# Patient Record
Sex: FEMALE | Race: WHITE | Employment: UNEMPLOYED | URBAN - METROPOLITAN AREA
[De-identification: names, ages, dates, MRNs, and addresses within clinical notes are randomized per-mention and may not be internally consistent; named-entity substitution may affect disease eponyms.]

---
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
## 2024-06-30 ENCOUNTER — HEALTH MAINTENANCE LETTER (OUTPATIENT)
Age: 19
End: 2024-06-30

## 2024-07-25 ENCOUNTER — OFFICE VISIT (OUTPATIENT)
Dept: FAMILY MEDICINE | Facility: CLINIC | Age: 19
End: 2024-07-25

## 2024-07-25 VITALS
SYSTOLIC BLOOD PRESSURE: 98 MMHG | OXYGEN SATURATION: 98 % | RESPIRATION RATE: 16 BRPM | TEMPERATURE: 98.2 F | DIASTOLIC BLOOD PRESSURE: 58 MMHG | HEART RATE: 65 BPM | WEIGHT: 120 LBS | BODY MASS INDEX: 21.13 KG/M2

## 2024-07-25 DIAGNOSIS — B07.0 PLANTAR WARTS: ICD-10-CM

## 2024-07-25 DIAGNOSIS — L73.9 FOLLICULITIS: Primary | ICD-10-CM

## 2024-07-25 PROCEDURE — 17110 DESTRUCTION B9 LES UP TO 14: CPT | Performed by: NURSE PRACTITIONER

## 2024-07-25 PROCEDURE — 99203 OFFICE O/P NEW LOW 30 MIN: CPT | Mod: 25 | Performed by: NURSE PRACTITIONER

## 2024-07-25 RX ORDER — MUPIROCIN 20 MG/G
OINTMENT TOPICAL 3 TIMES DAILY
Qty: 30 G | Refills: 0 | Status: SHIPPED | OUTPATIENT
Start: 2024-07-25

## 2024-07-25 ASSESSMENT — PAIN SCALES - GENERAL: PAINLEVEL: NO PAIN (0)

## 2024-07-25 NOTE — PATIENT INSTRUCTIONS
Use the topical antibiotic to the lesion on your leg.  Use hot packs 3-4 times daily to promote drainage.  Let me know if worsening or not improving.    Following treatment with liquid nitrogen your skin may get a blister. Try to keep this intact and keep the area clean and dry.  If no blister occurs, it is ok to use a pumice stone or nail file to gently file down the thickened warty tissue (do not use the same nail file that you use on your finger nails or it could spread the virus).  You may need to return for additional treatment if the wart does not completely resolve.  Plan to return after roughly 3 weeks once the area has healed, if still showing signs of persistent warty tissue.      May start using OTC treatments (Mediplast or Dr. Norton) if treatment ineffective.

## 2024-07-25 NOTE — PROGRESS NOTES
Assessment & Plan     Folliculitis  Appears to be inflamed follicle, no fluctuance, minimal surrounding erythema. I think this can be well treated with topical abx, discussed hot packing. Follow up if not improving.  - mupirocin (BACTROBAN) 2 % external ointment; Apply topically 3 times daily    Plantar warts  Small plantar wart to left foot. Reviewed over the counter options, would like LN treatment. See procedure note.  - DESTRUCT BENIGN LESION, UP TO 14            See Patient Instructions    Luci Clemons is a 19 year old, presenting for the following health issues:  Derm Problem and Wart        7/25/2024    10:49 AM   Additional Questions   Roomed by Miriam DERAS   Accompanied by self     History of Present Illness       Reason for visit:  Skin  Symptom onset:  3-7 days ago    She eats 0-1 servings of fruits and vegetables daily.She consumes 1 sweetened beverage(s) daily.She exercises with enough effort to increase her heart rate 10 to 19 minutes per day.  She exercises with enough effort to increase her heart rate 3 or less days per week.   She is taking medications regularly.     Lesion to left anterior thigh appeared after shaving 3-4 days ago. Has had scant amount of drainage. Is tender when she presses it. Also noticed tiny plantar wart to sole of left foot yesterday. No over the counter treatment.        Review of Systems  Constitutional, HEENT, cardiovascular, pulmonary, gi and gu systems are negative, except as otherwise noted.      Objective    BP 98/58   Pulse 65   Temp 98.2  F (36.8  C) (Tympanic)   Resp 16   Wt 54.4 kg (120 lb)   SpO2 98%   BMI 21.13 kg/m    Body mass index is 21.13 kg/m .  Physical Exam  Vitals and nursing note reviewed.   Constitutional:       General: She is not in acute distress.     Appearance: Normal appearance.   HENT:      Head: Normocephalic and atraumatic.      Mouth/Throat:      Mouth: Mucous membranes are moist.   Cardiovascular:      Rate and Rhythm: Normal  rate.   Pulmonary:      Effort: Pulmonary effort is normal.   Musculoskeletal:      Cervical back: Neck supple.      Comments: ~1cm pustular lesion overlying follicle with minimal surrounding erythema to left anterior thigh. No fluctuance. No induration.    Feet:      Comments: Tiny verrucous lesion to the plantar surface of the left midfoot  Skin:     General: Skin is warm and dry.   Neurological:      General: No focal deficit present.      Mental Status: She is alert.   Psychiatric:         Mood and Affect: Mood normal.         Behavior: Behavior normal.          After a complete discussion of the multiple treatment options for warts including the risks and bennefits of each, the patient has decided on treatment with Liquid nitrogen.   Each wart was frozen easily three times with liquid nitrogen. A total of 1 warts are treated today.  The etiology of common warts were discussed.  Warm soapy water soaks and sanding also recommended.  The patient is to return every two weeks until all warts have resolved.              Signed Electronically by: RENÉ Crooks CNP

## 2025-07-13 ENCOUNTER — HEALTH MAINTENANCE LETTER (OUTPATIENT)
Age: 20
End: 2025-07-13